# Patient Record
(demographics unavailable — no encounter records)

---

## 2025-01-17 NOTE — DATA REVIEWED
[de-identified] : I have reviewed the most recent MRI on 1/17/25 at [facility] which shows residual pituitary adenoma with growth on imaging.

## 2025-01-17 NOTE — HISTORY OF PRESENT ILLNESS
[FreeTextEntry1] : s/p Endoscopic endonasal transsphenoidal pituitary tumor resection 3/8/18. She had trailed cabergoline Endocrine labs were done on 1/11/18 Prolactin -60 IGF1 - no results  ACTH - no results  TSH - wnl cortisol: 14 -wnl 2/2/18  Labs 2/2/18 PRL 21.5 Serum cortisol 14  S/p TSPR 1/11/18, her postop course was complicated by nasal hemorrhage require embolization.  Pathology: Pituitary adenoma, focally positive for FSH, negative for PRL, ACTH, LH, GH, TSH, ki 67 2-3%  Denies abnormal growth of hands/feet, breast leaking, abnormal growth of facial features, significant unexplained weight loss/gain, sudden or peripheral vision loss and abnormal hair growth/loss. She has had normal menstrual cycles.   Recent labs 11/28/23 PRL 16  12/23/23-She comes in today for routine follow up with a new MRI for review. She is now following with endocrinologist Dr. Riggs at [facility].    TODAY 01/17/2025 presents for a routine MRI review imaging completed today at [facility].  Endorsing regular menstrual cycle. denies abnormal growth of hands/feet, breast leaking, abnormal growth of facial features, significant unexplained weight loss/gain, sudden or peripheral vision loss and abnormal hair growth/loss, excessive thirst or urination, or excessive fatigue. [facility] Endocrine labs 11/7/24 IGF-1: 197  Cortisol AM: 12.9 FSH: 14.7 (blood tested on the last day of patient menses) LH: 6.3 Prolactin: 10.8 TSH: 0.800

## 2025-01-17 NOTE — ASSESSMENT
[FreeTextEntry1] : My impression is that the patient suffers from pituitary adenoma that has been resected with growing residual.  I had a long discussion with the patient regarding the role of surgical resection vs surveillance and counseled her that surgery will be necessary if the lesion grows towards the optic chiasm. Her labs are stable.  Therapeutic and diagnostic tests include MRI Sella w/wo IVC in 1year if she does not want to proceed with surgeon. I have explained the alternatives, risks and benefits to the patient and her , and they understand and agree to proceed.

## 2025-01-17 NOTE — DATA REVIEWED
[de-identified] : I have reviewed the most recent MRI on 1/17/25 at [facility] which shows residual pituitary adenoma with growth on imaging.

## 2025-01-17 NOTE — HISTORY OF PRESENT ILLNESS
[FreeTextEntry1] : s/p Endoscopic endonasal transsphenoidal pituitary tumor resection 3/8/18. She had trailed cabergoline Endocrine labs were done on 1/11/18 Prolactin -60 IGF1 - no results  ACTH - no results  TSH - wnl cortisol: 14 -wnl 2/2/18  Labs 2/2/18 PRL 21.5 Serum cortisol 14  S/p TSPR 1/11/18, her postop course was complicated by nasal hemorrhage require embolization.  Pathology: Pituitary adenoma, focally positive for FSH, negative for PRL, ACTH, LH, GH, TSH, ki 67 2-3%  Denies abnormal growth of hands/feet, breast leaking, abnormal growth of facial features, significant unexplained weight loss/gain, sudden or peripheral vision loss and abnormal hair growth/loss. She has had normal menstrual cycles.   Recent labs 11/28/23 PRL 16  12/23/23-She comes in today for routine follow up with a new MRI for review. She is now following with endocrinologist Dr. Riggs at [facility].    TODAY 01/17/2025 presents for a routine MRI review imaging completed today at [facility].  Endorsing regular menstrual cycle. denies abnormal growth of hands/feet, breast leaking, abnormal growth of facial features, significant unexplained weight loss/gain, sudden or peripheral vision loss and abnormal hair growth/loss, excessive thirst or urination, or excessive fatigue. [facility] Endocrine labs 11/7/24 IGF-1: 197  Cortisol AM: 12.9 FSH: 14.7 (blood tested on the last day of patient menses) LH: 6.3 Prolactin: 10.8 TSH: 0.800

## 2025-02-14 NOTE — HISTORY OF PRESENT ILLNESS
[de-identified] : 41F here for initial evaluation.  She has known pituitary adenoma and is here in preoperative consultation. She underwent endoscopic transsphenoidal surgery in 2018; this was complicated by postoperative hemorrhage 14 days after surgery. At that time, she was taken to outside hospital and kept there with posterior packing in place for 4 days until transfer and control in OR here. Since then. she has had scarring of her left nostril w mild narrowing. All of that has obviously given her marked anxiety. She has no sinus issues or allergies.  MRI Sella 1/17/25 (I reviewed): -1.4 cm right pituitary lesion. The lesion is slightly akers compared to most recent prior study dated 12/1/2023, with more significant increase over multiple examinations dating back to 2/19/2021. The lesion is again noted to partially surround the right cavernous ICA. No mass effect upon the optic apparatus.  ROS otherwise unremarkable.

## 2025-02-14 NOTE — PROCEDURE
[FreeTextEntry3] : Nasal Endoscopy: nasal airways patent s/p transsphenoidal surgery - sphenoid widely patent, mucosalized no mucopus or polyps

## 2025-02-14 NOTE — DATA REVIEWED
[de-identified] : MRI Sella 1/17/25: FINDINGS: Postsurgical changes from prior transsphenoidal pituitary surgery. There is a T2 hyperintense nodule within the right pituitary gland, measuring 1.3 x 1.1 cm, slightly akers than on prior study. the lesion partially surrounds the cavernous right ICA superiorly and inferiorly, with suspected invasion of the cavernous sinus. `Infundibulum is deviated toward the left. Normal pituitary tissue is seen within the left sella. Left cavernous sinus is unremarkable. There is no significant suprasellar extension or mass effect upon the optic apparatus.  No acute infarction or intracranial hemorrhage. There is no intraparenchymal mass or abnormal intraparenchymal enhancement.  The ventricles are normal without evidence of hydrocephalus. There are no extra-axial fluid collections.  The visualized intraorbital contents are normal. The imaged portions of the paranasal sinuses are clear. The mastoid air cells are clear.  IMPRESSION:  1.4 cm right pituitary lesion. The lesion is slightly akers compared to most recent prior study dated 12/1/2023, with more significant increase over multiple examinations dating back to 2/19/2021. The lesion is again noted to partially surround the right cavernous ICA. No mass effect upon the optic apparatus.

## 2025-02-14 NOTE — ASSESSMENT
[FreeTextEntry1] : 41F here for initial evaluation. She has known pituitary adenoma and is here in preoperative consultation. She underwent endoscopic transsphenoidal surgery in 2018; this was complicated by postoperative hemorrhage 14 days after surgery. At that time, she was taken to outside hospital and kept there with posterior packing in place for 4 days until transfer and control in OR here. Since then. she has had scarring of her left nostril w mild narrowing. All of that has obviously given her marked anxiety. She has no sinus issues or allergies. MRI sella shows a 1.4cm right pituitary lesion which is akers compared to most recent prior study dated 12/1/2023, with more significant increase over multiple examinations dating back to 2/19/2021. The lesion is again noted to partially surround the right cavernous ICA w no mass effect upon the optic apparatus. On exam, nasal endoscopy shows postoperative changes as detailed above. She has recurrent pituitary adenoma. I had long discussion w pt and her  about my role in her care, which includes the endonasal approach to the sella and reconstruction. I reviewed possibility for CSF leak and methods of repair. I discussed postoperative course and all questions answered. They will let us know when they want to proceed. To get CT sinus for navigation prior to surgery.

## 2025-04-30 NOTE — REASON FOR VISIT
[Home] : at home, [unfilled] , at the time of the visit. [Medical Office: ([facility])___] : at the medical office located in  [Telehealth (audio & video)] : This visit was provided via telehealth using real-time 2-way audio visual technology. [Verbal consent obtained from patient] : the patient, [unfilled] [de-identified] : Endoscopic endonasal transsphenoidal pituitary tumor resection [de-identified] : 4/24/25

## 2025-04-30 NOTE — ASSESSMENT
[FreeTextEntry1] : PLAN: Ms. JOANNA HOWELL  presents for post-op telephone chek in with  complaints of constipation and we discussed the plan listed below. The patient verbalized understanding of the plan of care and agrees to proceed. - Trial colace in the AM, additional dose of sennakot in afternoon and sennakot at night, prune juice, increase fiber and water in diet. If symptoms do not improve and she does not have a bowel movement patient instructed to try a dose of OTC magnesium citrate.  - Follow-up with MD Garcia 5/9 -Followup with MD Martin 5/1 for   I have spent 20  minutes on this encounter.

## 2025-04-30 NOTE — HISTORY OF PRESENT ILLNESS
[FreeTextEntry1] : s/p Endoscopic endonasal transsphenoidal pituitary tumor resection 3/8/18. She had trailed cabergoline Endocrine labs were done on 1/11/18 Prolactin -60 IGF1 - no results  ACTH - no results  TSH - wnl cortisol: 14 -wnl 2/2/18  Labs 2/2/18 PRL 21.5 Serum cortisol 14  S/p TSPR 1/11/18, her postop course was complicated by nasal hemorrhage require embolization.  Pathology: Pituitary adenoma, focally positive for FSH, negative for PRL, ACTH, LH, GH, TSH, ki 67 2-3%  Denies abnormal growth of hands/feet, breast leaking, abnormal growth of facial features, significant unexplained weight loss/gain, sudden or peripheral vision loss and abnormal hair growth/loss. She has had normal menstrual cycles.   Recent labs 11/28/23 PRL 16  12/23/23-She comes in today for routine follow up with a new MRI for review. She is now following with endocrinologist Dr. Riggs at [facility].    01/17/2025 presents for a routine MRI review imaging completed today at [facility].  Endorsing regular menstrual cycle. denies abnormal growth of hands/feet, breast leaking, abnormal growth of facial features, significant unexplained weight loss/gain, sudden or peripheral vision loss and abnormal hair growth/loss, excessive thirst or urination, or excessive fatigue. [facility] Endocrine labs 11/7/24 IGF-1: 197  Cortisol AM: 12.9 FSH: 14.7 (blood tested on the last day of patient menses) LH: 6.3 Prolactin: 10.8 TSH: 0.800  JOANNA HOWELL is a 41 year presents today for a routine postop visit s/p Endoscopic endonasal transsphenoidal pituitary tumor resection on 4/24/25. The patient reports she is constipated despite taking miralax and sennakot otherwise she reports she is doing well; denies headaches, visual changes, or new/worsening focal neuro deficits. Denies any nasal drainage or discharge, trouble breathing, chest pain, fevers or chills.   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 hydrocortisone 10 mg oral tablet: 3 tab(s) orally once a day Take 2 tablets in the morning, 1 tablet in the evening pantoprazole 40 mg oral delayed release tablet: 1 tab(s) orally once a day (before a meal) phentermine 37.5 mg oral tablet: 1 tab(s) orally once a day polyethylene glycol 3350 oral powder for reconstitution: 17 gram(s) orally once a day as needed for  constipation senna leaf extract oral tablet: 2 tab(s) orally once a day (at bedtime) as needed for  constipation sodium chloride 0.65% nasal spray: 1 spray(s) nasal 4 times a day traZODone 50 mg oral tablet: 2 tab(s) orally once a day (at bedtime) as needed for  insomnia

## 2025-05-01 NOTE — ASSESSMENT
[FreeTextEntry1] : 41F here in first postoperative visit s/p transsphenoidal approach to resection of recurrent pituitary adenoma 4/24/25. She is doing well since discharge. There is mild congestion. She is using saline rinses. On exam, nasal endoscopy shows expected postoperative changes. She is doing well. For now, cont saline. Can start budesonide rinses next week. Slowly advance activity. RTO 3 weeks.

## 2025-05-01 NOTE — DATA REVIEWED
[de-identified] : MRI Sella 1/17/25: FINDINGS: Postsurgical changes from prior transsphenoidal pituitary surgery. There is a T2 hyperintense nodule within the right pituitary gland, measuring 1.3 x 1.1 cm, slightly akers than on prior study. the lesion partially surrounds the cavernous right ICA superiorly and inferiorly, with suspected invasion of the cavernous sinus. `Infundibulum is deviated toward the left. Normal pituitary tissue is seen within the left sella. Left cavernous sinus is unremarkable. There is no significant suprasellar extension or mass effect upon the optic apparatus.  No acute infarction or intracranial hemorrhage. There is no intraparenchymal mass or abnormal intraparenchymal enhancement.  The ventricles are normal without evidence of hydrocephalus. There are no extra-axial fluid collections.  The visualized intraorbital contents are normal. The imaged portions of the paranasal sinuses are clear. The mastoid air cells are clear.  IMPRESSION:  1.4 cm right pituitary lesion. The lesion is slightly akers compared to most recent prior study dated 12/1/2023, with more significant increase over multiple examinations dating back to 2/19/2021. The lesion is again noted to partially surround the right cavernous ICA. No mass effect upon the optic apparatus.

## 2025-05-01 NOTE — HISTORY OF PRESENT ILLNESS
[de-identified] : 41F here in first postoperative visit s/p transsphenoidal approach to resection of recurrent pituitary adenoma 4/24/25.  She is doing well since discharge. There is mild congestion. She is using saline rinses.  ROS otherwise unremarkable.

## 2025-05-01 NOTE — PROCEDURE
[FreeTextEntry3] : doyles removed  Nasal Endoscopy: coagulum removed septum intact absorbables in sphenoid choana clear

## 2025-05-09 NOTE — REASON FOR VISIT
[de-identified] : Endoscopic endonasal transsphenoidal pituitary tumor resection [de-identified] : 4/24/25

## 2025-05-09 NOTE — HISTORY OF PRESENT ILLNESS
[FreeTextEntry1] : s/p Endoscopic endonasal transsphenoidal pituitary tumor resection 3/8/18. She had trailed cabergoline Endocrine labs were done on 1/11/18 Prolactin -60 IGF1 - no results  ACTH - no results  TSH - wnl cortisol: 14 -wnl 2/2/18  Labs 2/2/18 PRL 21.5 Serum cortisol 14  S/p TSPR 1/11/18, her postop course was complicated by nasal hemorrhage require embolization.  Pathology: Pituitary adenoma, focally positive for FSH, negative for PRL, ACTH, LH, GH, TSH, ki 67 2-3%  Denies abnormal growth of hands/feet, breast leaking, abnormal growth of facial features, significant unexplained weight loss/gain, sudden or peripheral vision loss and abnormal hair growth/loss. She has had normal menstrual cycles.   Recent labs 11/28/23 PRL 16  12/23/23-She comes in today for routine follow up with a new MRI for review. She is now following with endocrinologist Dr. Riggs at [facility].    01/17/2025 presents for a routine MRI review imaging completed today at [facility].  Endorsing regular menstrual cycle. denies abnormal growth of hands/feet, breast leaking, abnormal growth of facial features, significant unexplained weight loss/gain, sudden or peripheral vision loss and abnormal hair growth/loss, excessive thirst or urination, or excessive fatigue. [facility] Endocrine labs 11/7/24 IGF-1: 197  Cortisol AM: 12.9 FSH: 14.7 (blood tested on the last day of patient menses) LH: 6.3 Prolactin: 10.8 TSH: 0.800  JOANNA HOWELL is a 41 year presents today for a routine postop visit s/p Endoscopic endonasal transsphenoidal pituitary tumor resection on 4/24/25. The patient reports her constipation resolved. otherwise she is well. denies headaches, visual changes, or new/worsening focal neuro deficits. Denies any nasal drainage or discharge, trouble breathing, chest pain, fevers or chills. Endocrine labs completed with MD Galloway and tapering off hydrocortisone.  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 The neoplastic cells show positive labeling with SF1 (diffuse), GATA3 (diffuse) , ER (patchy) FSH (diffuse) and negative labeling with PIT1, TPIT, CKCAM5.2, ACTH, Prolactin , GH, TSH and LH. Negative staining is noted with PAS special stain.   Discharge Medication List. hydrocortisone 10 mg oral tablet: 3 tab(s) orally once a day Take 2 tablets in the morning, 1 tablet in the evening pantoprazole 40 mg oral delayed release tablet: 1 tab(s) orally once a day (before a meal) phentermine 37.5 mg oral tablet: 1 tab(s) orally once a day polyethylene glycol 3350 oral powder for reconstitution: 17 gram(s) orally once a day as needed for  constipation senna leaf extract oral tablet: 2 tab(s) orally once a day (at bedtime) as needed for  constipation sodium chloride 0.65% nasal spray: 1 spray(s) nasal 4 times a day traZODone 50 mg oral tablet: 2 tab(s) orally once a day (at bedtime) as needed for  insomnia

## 2025-05-09 NOTE — ASSESSMENT
[FreeTextEntry1] : The patient's established pituitary adenoma has been re-resected on 4/24/25. I had a long discussion with the patient regarding the role of surveillance and endocrine follow-up. Therapeutic and diagnostic tests include MRI Sella w/wo IVC in 3 months postop 7/24/25. I have explained the alternatives, risks and benefits to the patient and her , and they understand and agree to proceed.

## 2025-05-09 NOTE — HISTORY OF PRESENT ILLNESS
[FreeTextEntry1] : s/p Endoscopic endonasal transsphenoidal pituitary tumor resection 3/8/18. She had trailed cabergoline Endocrine labs were done on 1/11/18 Prolactin -60 IGF1 - no results  ACTH - no results  TSH - wnl cortisol: 14 -wnl 2/2/18  Labs 2/2/18 PRL 21.5 Serum cortisol 14  S/p TSPR 1/11/18, her postop course was complicated by nasal hemorrhage require embolization.  Pathology: Pituitary adenoma, focally positive for FSH, negative for PRL, ACTH, LH, GH, TSH, ki 67 2-3%  Denies abnormal growth of hands/feet, breast leaking, abnormal growth of facial features, significant unexplained weight loss/gain, sudden or peripheral vision loss and abnormal hair growth/loss. She has had normal menstrual cycles.   Recent labs 11/28/23 PRL 16  12/23/23-She comes in today for routine follow up with a new MRI for review. She is now following with endocrinologist Dr. Riggs at [facility].    01/17/2025 presents for a routine MRI review imaging completed today at [facility].  Endorsing regular menstrual cycle. denies abnormal growth of hands/feet, breast leaking, abnormal growth of facial features, significant unexplained weight loss/gain, sudden or peripheral vision loss and abnormal hair growth/loss, excessive thirst or urination, or excessive fatigue. [facility] Endocrine labs 11/7/24 IGF-1: 197  Cortisol AM: 12.9 FSH: 14.7 (blood tested on the last day of patient menses) LH: 6.3 Prolactin: 10.8 TSH: 0.800  JOANNA HOWELL is a 41 year presents today for a routine postop visit s/p Endoscopic endonasal transsphenoidal pituitary tumor resection on 4/24/25. The patient reports her constipation resolved. otherwise she is well. denies headaches, visual changes, or new/worsening focal neuro deficits. Denies any nasal drainage or discharge, trouble breathing, chest pain, fevers or chills. Endocrine labs completed with MD Galloway and tapering off hydrocortisone.  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 The neoplastic cells show positive labeling with SF1 (diffuse), GATA3 (diffuse) , ER (patchy) FSH (diffuse) and negative labeling with PIT1, TPIT, CKCAM5.2, ACTH, Prolactin , GH, TSH and LH. Negative staining is noted with PAS special stain.   Discharge Medication List. hydrocortisone 10 mg oral tablet: 3 tab(s) orally once a day Take 2 tablets in the morning, 1 tablet in the evening pantoprazole 40 mg oral delayed release tablet: 1 tab(s) orally once a day (before a meal) phentermine 37.5 mg oral tablet: 1 tab(s) orally once a day polyethylene glycol 3350 oral powder for reconstitution: 17 gram(s) orally once a day as needed for  constipation senna leaf extract oral tablet: 2 tab(s) orally once a day (at bedtime) as needed for  constipation sodium chloride 0.65% nasal spray: 1 spray(s) nasal 4 times a day traZODone 50 mg oral tablet: 2 tab(s) orally once a day (at bedtime) as needed for  insomnia

## 2025-05-09 NOTE — REASON FOR VISIT
[de-identified] : Endoscopic endonasal transsphenoidal pituitary tumor resection [de-identified] : 4/24/25

## 2025-05-09 NOTE — PHYSICAL EXAM
[General Appearance - Alert] : alert [General Appearance - In No Acute Distress] : in no acute distress [Oriented To Time, Place, And Person] : oriented to person, place, and time [Affect] : the affect was normal [Cranial Nerves Oculomotor (III)] : extraocular motion intact [Cranial Nerves Facial (VII)] : face symmetrical [Motor Tone] : muscle tone was normal in all four extremities [Motor Strength] : muscle strength was normal in all four extremities [Outer Ear] : the ears and nose were normal in appearance [Neck Appearance] : the appearance of the neck was normal [] : no respiratory distress [Abnormal Walk] : normal gait

## 2025-05-22 NOTE — DATA REVIEWED
[de-identified] : MRI Sella 1/17/25: FINDINGS: Postsurgical changes from prior transsphenoidal pituitary surgery. There is a T2 hyperintense nodule within the right pituitary gland, measuring 1.3 x 1.1 cm, slightly akesr than on prior study. the lesion partially surrounds the cavernous right ICA superiorly and inferiorly, with suspected invasion of the cavernous sinus. `Infundibulum is deviated toward the left. Normal pituitary tissue is seen within the left sella. Left cavernous sinus is unremarkable. There is no significant suprasellar extension or mass effect upon the optic apparatus.  No acute infarction or intracranial hemorrhage. There is no intraparenchymal mass or abnormal intraparenchymal enhancement.  The ventricles are normal without evidence of hydrocephalus. There are no extra-axial fluid collections.  The visualized intraorbital contents are normal. The imaged portions of the paranasal sinuses are clear. The mastoid air cells are clear.  IMPRESSION:  1.4 cm right pituitary lesion. The lesion is slightly akers compared to most recent prior study dated 12/1/2023, with more significant increase over multiple examinations dating back to 2/19/2021. The lesion is again noted to partially surround the right cavernous ICA. No mass effect upon the optic apparatus.

## 2025-05-22 NOTE — PROCEDURE
[FreeTextEntry3] : Nasal Endoscopy: septum intact absorbables in sphenoid -> most removed sphenoid/sella mucosalizing w granulation, transmitted pulsations no drainage choana clear

## 2025-05-22 NOTE — ASSESSMENT
[FreeTextEntry1] : 41F here in postoperative visit s/p transsphenoidal approach to resection of recurrent pituitary adenoma 4/24/25. She is doing well since last seen. She is using budesonide rinses. There has been mild nasal odor. There is no pain and no drainage. On exam, nasal endoscopy shows expected postoperative changes as above. She is doing well. For now, cont budesonide rinses as she is doing. Cont to advance activity. RTO 3 weeks.

## 2025-05-22 NOTE — HISTORY OF PRESENT ILLNESS
[de-identified] : 41F here in postoperative visit s/p transsphenoidal approach to resection of recurrent pituitary adenoma 4/24/25.  She is doing well since last seen. She is using budesonide rinses. There has been mild nasal odor. No pain and no drainage.  ROS otherwise unremarkable.

## 2025-06-20 NOTE — ASSESSMENT
[FreeTextEntry1] : 41F here in postoperative visit s/p transsphenoidal approach to resection of recurrent pituitary adenoma 4/24/25. She is doing well since last seen. She is using budesonide rinses. There has been occasional mild nasal odor. There is no pain and no drainage. On exam, nasal endoscopy shows expected postoperative changes as above. She is doing well. For now, cont budesonide rinses BID as she is doing. Normal activity. RTO 4 weeks.

## 2025-06-20 NOTE — PROCEDURE
[FreeTextEntry3] : Nasal Endoscopy: septum intact crusting in sphenoid w debris -> all removed sphenoid/sella mucosalized w granulation, transmitted pulsations no drainage choana clear

## 2025-06-20 NOTE — HISTORY OF PRESENT ILLNESS
[de-identified] : 41F here in postoperative visit s/p transsphenoidal approach to resection of recurrent pituitary adenoma 4/24/25.  She is doing well since last seen. She is using budesonide rinses. There has been occasional nasal odor. No pain and no drainage.  ROS otherwise unremarkable.

## 2025-06-20 NOTE — DATA REVIEWED
[de-identified] : MRI Sella 1/17/25: FINDINGS: Postsurgical changes from prior transsphenoidal pituitary surgery. There is a T2 hyperintense nodule within the right pituitary gland, measuring 1.3 x 1.1 cm, slightly akers than on prior study. the lesion partially surrounds the cavernous right ICA superiorly and inferiorly, with suspected invasion of the cavernous sinus. `Infundibulum is deviated toward the left. Normal pituitary tissue is seen within the left sella. Left cavernous sinus is unremarkable. There is no significant suprasellar extension or mass effect upon the optic apparatus.  No acute infarction or intracranial hemorrhage. There is no intraparenchymal mass or abnormal intraparenchymal enhancement.  The ventricles are normal without evidence of hydrocephalus. There are no extra-axial fluid collections.  The visualized intraorbital contents are normal. The imaged portions of the paranasal sinuses are clear. The mastoid air cells are clear.  IMPRESSION:  1.4 cm right pituitary lesion. The lesion is slightly akers compared to most recent prior study dated 12/1/2023, with more significant increase over multiple examinations dating back to 2/19/2021. The lesion is again noted to partially surround the right cavernous ICA. No mass effect upon the optic apparatus.

## 2025-07-25 NOTE — DATA REVIEWED
[de-identified] : MRI Sella 1/17/25: FINDINGS: Postsurgical changes from prior transsphenoidal pituitary surgery. There is a T2 hyperintense nodule within the right pituitary gland, measuring 1.3 x 1.1 cm, slightly akers than on prior study. the lesion partially surrounds the cavernous right ICA superiorly and inferiorly, with suspected invasion of the cavernous sinus. `Infundibulum is deviated toward the left. Normal pituitary tissue is seen within the left sella. Left cavernous sinus is unremarkable. There is no significant suprasellar extension or mass effect upon the optic apparatus.  No acute infarction or intracranial hemorrhage. There is no intraparenchymal mass or abnormal intraparenchymal enhancement.  The ventricles are normal without evidence of hydrocephalus. There are no extra-axial fluid collections.  The visualized intraorbital contents are normal. The imaged portions of the paranasal sinuses are clear. The mastoid air cells are clear.  IMPRESSION:  1.4 cm right pituitary lesion. The lesion is slightly akers compared to most recent prior study dated 12/1/2023, with more significant increase over multiple examinations dating back to 2/19/2021. The lesion is again noted to partially surround the right cavernous ICA. No mass effect upon the optic apparatus.

## 2025-07-25 NOTE — HISTORY OF PRESENT ILLNESS
[de-identified] : 41F here in postoperative visit s/p transsphenoidal approach to resection of recurrent pituitary adenoma 4/24/25.  She is doing well since last seen. She is using budesonide rinses rarely. There has been no further nasal odor. No pain and no drainage. She also c/o long standing postnasal drip. No known reflux.  ROS otherwise unremarkable.

## 2025-07-25 NOTE — ASSESSMENT
[FreeTextEntry1] : 41F here in postoperative visit s/p transsphenoidal approach to resection of recurrent pituitary adenoma 4/24/25. She is doing well since last seen. She is using budesonide rinses rarely. There has been no further nasal odor. No pain and no drainage. On exam, nasal endoscopy shows expected well healed postoperative changes as above. She is doing well. For now, cont budesonide rinses as needed w some regularity. RTO as needed.

## 2025-07-25 NOTE — PROCEDURE
[FreeTextEntry3] : Nasal Endoscopy: septum intact sphenoid/sella mucosalized w mild granulation, transmitted pulsations no drainage choana clear  Laryngoscopy clear no lesions

## 2025-07-29 NOTE — ASSESSMENT
[FreeTextEntry1] : The patient's established pituitary adenoma has been re-resected on 4/24/25. I have reviewed her MRI brain w/wo from 7/25/25 which shows evidence of _____. I had a long discussion with the patient regarding the role of surveillance and endocrine follow-up. Therapeutic and diagnostic tests include MRI Sella w/wo IVC in 3 months postop 7/24/25. I have explained the alternatives, risks and benefits to the patient and her , and they understand and agree to proceed.

## 2025-07-29 NOTE — REASON FOR VISIT
[Follow-Up: _____] : a [unfilled] follow-up visit [FreeTextEntry1] : review MRI brain from [facility] 7/25/25

## 2025-07-29 NOTE — REASON FOR VISIT
[Follow-Up: _____] : a [unfilled] follow-up visit [FreeTextEntry1] : review MRI brain from [facility] 7/25/25

## 2025-07-29 NOTE — HISTORY OF PRESENT ILLNESS
[FreeTextEntry1] : s/p Endoscopic endonasal transsphenoidal pituitary tumor resection 3/8/18. She had trailed cabergoline Endocrine labs were done on 1/11/18 Prolactin -60 IGF1 - no results  ACTH - no results  TSH - wnl cortisol: 14 -wnl 2/2/18  Labs 2/2/18 PRL 21.5 Serum cortisol 14  S/p TSPR 1/11/18, her postop course was complicated by nasal hemorrhage require embolization.  Pathology: Pituitary adenoma, focally positive for FSH, negative for PRL, ACTH, LH, GH, TSH, ki 67 2-3%  Denies abnormal growth of hands/feet, breast leaking, abnormal growth of facial features, significant unexplained weight loss/gain, sudden or peripheral vision loss and abnormal hair growth/loss. She has had normal menstrual cycles.   Recent labs 11/28/23 PRL 16  12/23/23-She comes in today for routine follow up with a new MRI for review. She is now following with endocrinologist Dr. Riggs at [facility].    01/17/2025 presents for a routine MRI review imaging completed today at [facility].  Endorsing regular menstrual cycle. denies abnormal growth of hands/feet, breast leaking, abnormal growth of facial features, significant unexplained weight loss/gain, sudden or peripheral vision loss and abnormal hair growth/loss, excessive thirst or urination, or excessive fatigue. [facility] Endocrine labs 11/7/24 IGF-1: 197  Cortisol AM: 12.9 FSH: 14.7 (blood tested on the last day of patient menses) LH: 6.3 Prolactin: 10.8 TSH: 0.800  JOANNA HOWELL is a 41 year presents today for a routine postop visit s/p Endoscopic endonasal transsphenoidal pituitary tumor resection on 4/24/25. The patient reports her constipation resolved. otherwise she is well. denies headaches, visual changes, or new/worsening focal neuro deficits. Denies any nasal drainage or discharge, trouble breathing, chest pain, fevers or chills. Endocrine labs completed with MD Galloway and tapering off hydrocortisone.  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 The neoplastic cells show positive labeling with SF1 (diffuse), GATA3 (diffuse) , ER (patchy) FSH (diffuse) and negative labeling with PIT1, TPIT, CKCAM5.2, ACTH, Prolactin , GH, TSH and LH. Negative staining is noted with PAS special stain.   Discharge Medication List. hydrocortisone 10 mg oral tablet: 3 tab(s) orally once a day Take 2 tablets in the morning, 1 tablet in the evening pantoprazole 40 mg oral delayed release tablet: 1 tab(s) orally once a day (before a meal) phentermine 37.5 mg oral tablet: 1 tab(s) orally once a day polyethylene glycol 3350 oral powder for reconstitution: 17 gram(s) orally once a day as needed for  constipation senna leaf extract oral tablet: 2 tab(s) orally once a day (at bedtime) as needed for  constipation sodium chloride 0.65% nasal spray: 1 spray(s) nasal 4 times a day traZODone 50 mg oral tablet: 2 tab(s) orally once a day (at bedtime) as needed for  insomnia  7/25/25: MRI brain @[facility]  TODAY 8/1/25: Patient presents to review MRI brain from [facility] from 7/25/25. 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

## 2025-07-29 NOTE — HISTORY OF PRESENT ILLNESS
[FreeTextEntry1] : s/p Endoscopic endonasal transsphenoidal pituitary tumor resection 3/8/18. She had trailed cabergoline Endocrine labs were done on 1/11/18 Prolactin -60 IGF1 - no results  ACTH - no results  TSH - wnl cortisol: 14 -wnl 2/2/18  Labs 2/2/18 PRL 21.5 Serum cortisol 14  S/p TSPR 1/11/18, her postop course was complicated by nasal hemorrhage require embolization.  Pathology: Pituitary adenoma, focally positive for FSH, negative for PRL, ACTH, LH, GH, TSH, ki 67 2-3%  Denies abnormal growth of hands/feet, breast leaking, abnormal growth of facial features, significant unexplained weight loss/gain, sudden or peripheral vision loss and abnormal hair growth/loss. She has had normal menstrual cycles.   Recent labs 11/28/23 PRL 16  12/23/23-She comes in today for routine follow up with a new MRI for review. She is now following with endocrinologist Dr. Riggs at [facility].    01/17/2025 presents for a routine MRI review imaging completed today at [facility].  Endorsing regular menstrual cycle. denies abnormal growth of hands/feet, breast leaking, abnormal growth of facial features, significant unexplained weight loss/gain, sudden or peripheral vision loss and abnormal hair growth/loss, excessive thirst or urination, or excessive fatigue. [facility] Endocrine labs 11/7/24 IGF-1: 197  Cortisol AM: 12.9 FSH: 14.7 (blood tested on the last day of patient menses) LH: 6.3 Prolactin: 10.8 TSH: 0.800  JOANNA HOWELL is a 41 year presents today for a routine postop visit s/p Endoscopic endonasal transsphenoidal pituitary tumor resection on 4/24/25. The patient reports her constipation resolved. otherwise she is well. denies headaches, visual changes, or new/worsening focal neuro deficits. Denies any nasal drainage or discharge, trouble breathing, chest pain, fevers or chills. Endocrine labs completed with MD Galloway and tapering off hydrocortisone.  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 The neoplastic cells show positive labeling with SF1 (diffuse), GATA3 (diffuse) , ER (patchy) FSH (diffuse) and negative labeling with PIT1, TPIT, CKCAM5.2, ACTH, Prolactin , GH, TSH and LH. Negative staining is noted with PAS special stain.   Discharge Medication List. hydrocortisone 10 mg oral tablet: 3 tab(s) orally once a day Take 2 tablets in the morning, 1 tablet in the evening pantoprazole 40 mg oral delayed release tablet: 1 tab(s) orally once a day (before a meal) phentermine 37.5 mg oral tablet: 1 tab(s) orally once a day polyethylene glycol 3350 oral powder for reconstitution: 17 gram(s) orally once a day as needed for  constipation senna leaf extract oral tablet: 2 tab(s) orally once a day (at bedtime) as needed for  constipation sodium chloride 0.65% nasal spray: 1 spray(s) nasal 4 times a day traZODone 50 mg oral tablet: 2 tab(s) orally once a day (at bedtime) as needed for  insomnia  7/25/25: MRI brain @[facility]  TODAY 8/1/25: Patient presents to review MRI brain from [facility] from 7/25/25. 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